# Patient Record
Sex: MALE | Race: WHITE
[De-identification: names, ages, dates, MRNs, and addresses within clinical notes are randomized per-mention and may not be internally consistent; named-entity substitution may affect disease eponyms.]

---

## 2019-04-29 ENCOUNTER — HOSPITAL ENCOUNTER (OUTPATIENT)
Dept: HOSPITAL 56 - MW.SDS | Age: 49
Discharge: HOME | End: 2019-04-29
Attending: SURGERY
Payer: COMMERCIAL

## 2019-04-29 DIAGNOSIS — K62.5: Primary | ICD-10-CM

## 2019-04-29 DIAGNOSIS — Z79.899: ICD-10-CM

## 2019-04-29 DIAGNOSIS — K21.9: ICD-10-CM

## 2019-04-29 DIAGNOSIS — R19.4: ICD-10-CM

## 2019-04-29 PROCEDURE — 45378 DIAGNOSTIC COLONOSCOPY: CPT

## 2019-04-29 NOTE — PCM.OPNOTE
- General Post-Op/Procedure Note


Date of Surgery/Procedure: 04/29/19


Operative Procedure(s): Colonoscopy


Pre Op Diagnosis: Change in bowel habits.  Rectal bleeding.


Post-Op Diagnosis: No evidence of neoplasia.


Anesthesia Technique: MAC (ASA II)


Primary Surgeon: Silvestre Macias


Condition: Good


Free Text/Narrative:: 





DICTATION 540894





CPT CODE 35670

## 2019-04-29 NOTE — OR
SURGEON:

Silvestre Macias M.D.

 

DATE OF PROCEDURE:  04/29/2019

 

OPERATION PERFORMED:

Colonoscopy.

 

ANESTHESIA:

MAC.

 

ASA CLASSIFICATION:

II.

 

PREOPERATIVE DIAGNOSES:

1. Change in bowel habits.

2. Rectal bleeding.

 

POSTOPERATIVE DIAGNOSIS:

No evidence of neoplasia.

 

DESCRIPTION OF PROCEDURE:

The patient was taken to the endoscopy room and positioned on the endoscopy

table in the left lateral decubitus position.  Time-out was called for

appropriate identification of the patient and procedure.  Monitored anesthesia

care was provided.  The colonoscope was inserted into the rectum and advanced

with minimal difficulty to the cecum.  The cecum was identified by internal

landmarks and external pressure.  The colonoscope was retroflexed to visualize

the ascending colon from below, then straightened, and slowly withdrawn.  The

cecum, ascending colon, hepatic flexure, transverse colon, splenic flexure,

descending colon, sigmoid colon, and rectum were very well visualized.  No

tumors, polyps, diverticula, or angiodysplastic changes were noted anywhere

throughout the lower gastrointestinal tract.  Once the colonoscope was withdrawn

to the rectum, it was retroflexed to visualize the anal orifice from above.  No

tumors or polyps were seen and there were no acute hemorrhoidal changes.  The

colonoscope was then straightened, the rectum aspirated, and the colonoscope

removed.

 

The patient tolerated the procedure well and was taken to recovery room in

stable condition.

 

 

LAVINIA CONDE

DD:  04/29/2019 14:18:09

DT:  04/29/2019 17:15:31

Job #:  [id number]

## 2019-04-29 NOTE — PCM.PREANE
Preanesthetic Assessment





- Anesthesia/Transfusion/Family Hx


Anesthesia History: Prior Anesthesia Without Reaction


Family History of Anesthesia Reaction: No


Transfusion History: No Prior Transfusion(s)


Intubation History: Unknown





- Review of Systems


General: No Symptoms


Pulmonary: No Symptoms


Cardiovascular: No Symptoms


Gastrointestinal: Hematochezia, Other (change in bowel habits)


Neurological: No Symptoms


Other: Reports: None





- Physical Assessment


O2 Sat by Pulse Oximetry: 98


Respiratory Rate: 16


Vital Signs: 





 Last Vital Signs











Temp  36.3 C   04/29/19 11:55


 


Pulse  58 L  04/29/19 11:55


 


Resp  16   04/29/19 11:55


 


BP  125/82   04/29/19 11:55


 


Pulse Ox  98   04/29/19 11:55











Height: 1.88 m


Weight: 122.47 kg


ASA Class: 2


Mental Status: Alert & Oriented x3


Airway Class: Mallampati = 2


Dentition: Reports: Normal Dentition


Thyro-Mental Finger Breadths: 3


Mouth Opening Finger Breadths: 2


ROM/Head Extension: Full


Lungs: Clear to Auscultation, Normal Respiratory Effort


Cardiovascular: Regular Rate, Regular Rhythm





- Allergies


Allergies/Adverse Reactions: 


 Allergies











Allergy/AdvReac Type Severity Reaction Status Date / Time


 


No Known Allergies Allergy   Verified 04/24/19 14:46














- Blood


Blood Available: No





- Anesthesia Plan


Pre-Op Medication Ordered: None





- Acknowledgements


Anesthesia Type Planned: MAC


Pt an Appropriate Candidate for the Planned Anesthesia: Yes


Alternatives and Risks of Anesthesia Discussed w Pt/Guardian: Yes


Pt/Guardian Understands and Agrees with Anesthesia Plan: Yes





PreAnesthesia Questionnaire


HEENT History: Reports: Other (See Below)


Other HEENT History: wears glasses/contacts


Gastrointestinal History: Reports: GERD


Musculoskeletal History: Reports: Arthritis, Fracture


Other Musculoskeletal History: has arthritis in knees,  hx of fx right humerus


Endocrine/Metabolic History: Reports: Obesity/BMI 30+





- Past Surgical History


Musculoskeletal Surgical History: Reports: ORIF


Other Musculoskeletal Surgeries/Procedures:: hx of ORIF right humerus- hardware 

removed





- SUBSTANCE USE


Smoking Status *Q: Never Smoker


Recreational Drug Use History: No





- HOME MEDS


Home Medications: 


 Home Meds





Lansoprazole 30 mg PO QAM 04/24/19 [History]











- CURRENT (IN HOUSE) MEDS


Current Meds: 





 Current Medications





Lactated Ringer's (Ringers, Lactated)  1,000 mls @ 125 mls/hr IV ASDIRECTED ROSE